# Patient Record
Sex: FEMALE | URBAN - METROPOLITAN AREA
[De-identification: names, ages, dates, MRNs, and addresses within clinical notes are randomized per-mention and may not be internally consistent; named-entity substitution may affect disease eponyms.]

---

## 2021-07-26 ENCOUNTER — TELEPHONE ENCOUNTER (OUTPATIENT)
Dept: URBAN - METROPOLITAN AREA CLINIC 9 | Facility: CLINIC | Age: 61
End: 2021-07-26

## 2021-07-29 ENCOUNTER — TELEPHONE ENCOUNTER (OUTPATIENT)
Dept: URBAN - METROPOLITAN AREA CLINIC 9 | Facility: CLINIC | Age: 61
End: 2021-07-29

## 2021-08-13 ENCOUNTER — OFFICE VISIT (OUTPATIENT)
Dept: URBAN - METROPOLITAN AREA CLINIC 7 | Facility: CLINIC | Age: 61
End: 2021-08-13

## 2021-08-13 ENCOUNTER — TELEPHONE ENCOUNTER (OUTPATIENT)
Dept: URBAN - METROPOLITAN AREA CLINIC 9 | Facility: CLINIC | Age: 61
End: 2021-08-13

## 2021-09-17 ENCOUNTER — LAB OUTSIDE AN ENCOUNTER (OUTPATIENT)
Age: 61
End: 2021-09-17

## 2021-09-27 LAB
% SATURATION: (no result)
ALBUMIN/GLOBULIN RATIO: (no result)
ALBUMIN: (no result)
ALKALINE PHOSPHATASE: (no result)
ALPHA-1-ANTITRYPSIN QN: (no result)
ALT: (no result)
ANA PATTERN: (no result)
ANA SCREEN, IFA: POSITIVE
ANA TITER: (no result)
AST: (no result)
BILIRUBIN, DIRECT: (no result)
BILIRUBIN, INDIRECT: 0.5
BILIRUBIN, TOTAL: (no result)
CERULOPLASMIN: (no result)
FERRITIN: (no result)
GLIADIN (DEAMIDATED) AB (IGG): (no result)
GLOBULIN: 2.5
IMMUNOGLOBULIN A: (no result)
IMMUNOGLOBULIN G: (no result)
IMMUNOGLOBULIN M: (no result)
IRON BINDING CAPACITY: 350
IRON, TOTAL: (no result)
MITOCHONDRIAL AB SCREEN: NEGATIVE
PROTEIN, TOTAL: (no result)
SMOOTH MUSCLE AB SCREEN: NEGATIVE
THYROID PEROXIDASE ANTIBODIES: (no result)
TISSUE TRANSGLUTAMINASE AB, IGA: (no result)

## 2021-10-01 ENCOUNTER — TELEPHONE ENCOUNTER (OUTPATIENT)
Dept: URBAN - METROPOLITAN AREA CLINIC 9 | Facility: CLINIC | Age: 61
End: 2021-10-01

## 2021-10-01 ENCOUNTER — OFFICE VISIT (OUTPATIENT)
Dept: URBAN - METROPOLITAN AREA SURGERY CENTER 5 | Facility: SURGERY CENTER | Age: 61
End: 2021-10-01

## 2021-10-13 ENCOUNTER — TELEPHONE ENCOUNTER (OUTPATIENT)
Dept: URBAN - METROPOLITAN AREA CLINIC 9 | Facility: CLINIC | Age: 61
End: 2021-10-13

## 2022-07-30 ENCOUNTER — TELEPHONE ENCOUNTER (OUTPATIENT)
Age: 62
End: 2022-07-30

## 2022-07-31 ENCOUNTER — TELEPHONE ENCOUNTER (OUTPATIENT)
Age: 62
End: 2022-07-31

## 2025-07-17 ENCOUNTER — DASHBOARD ENCOUNTERS (OUTPATIENT)
Age: 65
End: 2025-07-17

## 2025-07-17 ENCOUNTER — OFFICE VISIT (OUTPATIENT)
Dept: URBAN - METROPOLITAN AREA CLINIC 7 | Facility: CLINIC | Age: 65
End: 2025-07-17
Payer: COMMERCIAL

## 2025-07-17 DIAGNOSIS — R94.5 ABNORMAL LIVER FUNCTION: ICD-10-CM

## 2025-07-17 DIAGNOSIS — I10 HYPERTENSION, UNSPECIFIED TYPE: ICD-10-CM

## 2025-07-17 DIAGNOSIS — R19.5 HARD STOOL: ICD-10-CM

## 2025-07-17 DIAGNOSIS — R74.8 ELEVATED ALKALINE PHOSPHATASE LEVEL: ICD-10-CM

## 2025-07-17 DIAGNOSIS — K82.4 GALLBLADDER POLYP: ICD-10-CM

## 2025-07-17 DIAGNOSIS — K76.0 HEPATIC STEATOSIS: ICD-10-CM

## 2025-07-17 DIAGNOSIS — K75.81 NASH (NONALCOHOLIC STEATOHEPATITIS): ICD-10-CM

## 2025-07-17 DIAGNOSIS — R10.84 GENERALIZED ABDOMINAL PAIN: ICD-10-CM

## 2025-07-17 DIAGNOSIS — E78.5 HYPERLIPIDEMIA, UNSPECIFIED HYPERLIPIDEMIA TYPE: ICD-10-CM

## 2025-07-17 PROBLEM — 75295004: Status: ACTIVE | Noted: 2025-07-17

## 2025-07-17 PROBLEM — 75183008: Status: ACTIVE | Noted: 2025-07-17

## 2025-07-17 PROBLEM — 197321007: Status: ACTIVE | Noted: 2025-07-17

## 2025-07-17 PROBLEM — 102614006: Status: ACTIVE | Noted: 2025-07-17

## 2025-07-17 PROBLEM — 274770006: Status: ACTIVE | Noted: 2025-07-17

## 2025-07-17 PROBLEM — 408512008: Status: ACTIVE | Noted: 2025-07-17

## 2025-07-17 PROBLEM — 14760008: Status: ACTIVE | Noted: 2025-07-17

## 2025-07-17 PROCEDURE — 99204 OFFICE O/P NEW MOD 45 MIN: CPT

## 2025-07-17 RX ORDER — IRBESARTAN 300 MG/1
1 TABLET TABLET ORAL ONCE A DAY
Qty: 30 | Status: ACTIVE | COMMUNITY

## 2025-07-17 RX ORDER — AMLODIPINE BESYLATE 10 MG/1
1 TABLET TABLET ORAL ONCE A DAY
Qty: 30 | Status: ACTIVE | COMMUNITY

## 2025-07-17 NOTE — HPI-TODAY'S VISIT:
Patient is a 65-year-old female presenting as a new patient for evaluation of abdominal pain and abnormal liver findings, referred by her PCP.  She reports intermittent abdominal pain that has worsened over the past few months, primarily localized to the RUQ and LLQ. The pain can sometimes be worse after eating. She also reports a recent fall so is unsure if that is a contributing factor. She typically has a pattern of having up to 5 bowel movements in the morning. She experiences a sensation of incomplete evacuation and reports difficulty sitting on the toilet for extended periods, though she denies digital manipulation. Stool consistency is typically soft but varies. She notes that bowel frequency has increased since undergoing a hemorrhoidectomy approximately two years ago.  Occasionalyl she will have hard, pellet-like stools with mucus. She has recently reduced her fiber intake. She denies rectal bleeding or unintentional weight loss. Of note, she had an episode of diverticulitis a few months ago, which improved with a clear liquid diet. No antibiotics were given.   She is also being seen today for evaluation of recently obtained laboratory and imaging results showing elevated liver function tests (LFTs) with a cholestatic pattern, including elevated alkaline phosphatase (ALP), as well as hepatic steatosis. She has a known history of elevated LFTs. There are no identified risk factors for chronic viral hepatitis. She reports a history of moderate alcohol use, but currently consumes alcohol only about once per month. She previously used herbal supplements but has since discontinued them. She denies use of medications known to be associated with hepatic steatosis, including amiodarone, methotrexate, tamoxifen, valproic acid, or glucocorticoids. She has multiple metabolic syndrome risk factors, including elevated BMI, hypertension, and hyperlipidemia, but does not have diabetes. There are no clinical, laboratory, imaging, or physical exam findings suggestive of portal hypertension. She has no family history of liver disease and has never undergone a liver biopsy. She denies fever, chills, weight loss, nausea, vomiting, recent travel, sick contacts, or new medication use.  Outside Records Reviewed: -Labs reviewed from February 2025, alkaline phosphatase isoenzymes with elevated alkaline phosphatase 133.  Liver fraction 56, bone fraction 44, intestinal fraction 0.  November with normal H&H, normal LFTs besides alkaline phosphatase 127, normal thyroid function. -Abdominal US from January 2025 with a small 4 mm gallbladder polyp.  No stones noted.  Liver is enlarged with no focal mass. -FibroScan from January 2025 with severe steatosis S3 and no fibrosis F0 noted.  Overall score slightly improved. .8 kPa -Prior FibroScan from 2023 S3, F0.  3.8 kPa.  Procedures: -Colonoscopy in October 2021 with Dr. Caldera with prolapse erythematous and inflamed internal hemorrhoids grade III-IV, severe diverticulosis in the sigmoid colon with associated luminal narrowing/colonic fixation/mucosal hypertrophy and erythema, moderate diverticulosis in the descending colon and scattered right colon, nonbleeding external hemorrhoids, colonic biopsies negative for microscopic colitis.  Repeat in 10 years for screening purposes.  Referred to colorectal surgery given size of hemorrhoids.

## 2025-07-24 LAB
ACTIN (SMOOTH MUSCLE) ANTIBODY: 4
MITOCHONDRIAL (M2) ANTIBODY: <20

## 2025-07-29 LAB
% SATURATION: 36
ALPHA-1-ANTITRYPSIN QN: 130
ANA SCREEN, IFA: NEGATIVE
CERULOPLASMIN: 24
FERRITIN: 98
HBSAG SCREEN: (no result)
HEP A AB, IGM: (no result)
HEP B CORE AB, IGM: (no result)
HEPATITIS C ANTIBODY: (no result)
IRON BINDING CAPACITY: 332
IRON, TOTAL: 120
LKM-1 ANTIBODY (IGG): <=20
MITOCHONDRIAL (M2) ANTIBODY: <=20
SOLUBLE LIVER ANTIGEN (SLA) AUTOANTIBODY: <20.1

## 2025-08-13 ENCOUNTER — LAB OUTSIDE AN ENCOUNTER (OUTPATIENT)
Dept: URBAN - METROPOLITAN AREA CLINIC 7 | Facility: CLINIC | Age: 65
End: 2025-08-13

## 2025-08-13 ENCOUNTER — TELEPHONE ENCOUNTER (OUTPATIENT)
Dept: URBAN - METROPOLITAN AREA CLINIC 7 | Facility: CLINIC | Age: 65
End: 2025-08-13

## 2025-08-13 RX ORDER — MOXIFLOXACIN HYDROCHLORIDE 400 MG/1
1 TABLET TABLET, FILM COATED ORAL ONCE A DAY
Qty: 14 TABLET | Refills: 0 | OUTPATIENT
Start: 2025-08-13 | End: 2025-08-27

## 2025-08-27 ENCOUNTER — TELEPHONE ENCOUNTER (OUTPATIENT)
Dept: URBAN - METROPOLITAN AREA CLINIC 7 | Facility: CLINIC | Age: 65
End: 2025-08-27